# Patient Record
(demographics unavailable — no encounter records)

---

## 2025-02-26 NOTE — HEALTH RISK ASSESSMENT
[Fair] :  ~his/her~ mood as fair [0] : 2) Feeling down, depressed, or hopeless: Not at all (0) [[id number]] : 0 [Current] : Current [0-4] : 0-4

## 2025-02-26 NOTE — ASSESSMENT
[FreeTextEntry1] : 27 yrs old M with no pmx comes in to establish care.   # head injury:  recent visit to ER on 02/16/2025 for head injury, has been drinking heavily that week, 10-12 drinks per day, hit his head while playing [employer], no bleeding, no LOC or seizure. Has been having frontal headaches, mild from that on and off, no other complains.   CT brain: Unremarkable head CT.  Plan: Tylenol 500 mg TID as needed monitor  # chronic gastritis: Has been having chronic nausea, indigestion for more than 5 yrs now, denies any fevers, chills, night sweats, diarrhea, constipation, blood in stool.  No other complains.   Plan: GERD precautions discussed GI referral for EGD.

## 2025-02-26 NOTE — HISTORY OF PRESENT ILLNESS
[FreeTextEntry1] : head injury chronic indigestion [de-identified] : 27 yrs old M with no pmx comes in to establish care.  recent visit to ER on 02/16/2025 for head injury, has been drinking heavily that week, 10-12 drinks per day, hit his head while playing [employer], no bleeding, no LOC or seizure. Has been having frontal headaches, mild from that on and off, no other complains.  Has been having chronic nausea, indigestion for more than 5 yrs now, denies any fevers, chills, night sweats, diarrhea, constipation, blood in stool.  No other complains.   CT brain: Unremarkable head CT.